# Patient Record
Sex: MALE | Race: OTHER | NOT HISPANIC OR LATINO
[De-identification: names, ages, dates, MRNs, and addresses within clinical notes are randomized per-mention and may not be internally consistent; named-entity substitution may affect disease eponyms.]

---

## 2017-01-02 ENCOUNTER — RX RENEWAL (OUTPATIENT)
Age: 47
End: 2017-01-02

## 2017-01-06 ENCOUNTER — OTHER (OUTPATIENT)
Age: 47
End: 2017-01-06

## 2017-01-11 ENCOUNTER — OTHER (OUTPATIENT)
Age: 47
End: 2017-01-11

## 2017-01-13 ENCOUNTER — OTHER (OUTPATIENT)
Age: 47
End: 2017-01-13

## 2017-02-08 ENCOUNTER — APPOINTMENT (OUTPATIENT)
Dept: INTERNAL MEDICINE | Facility: CLINIC | Age: 47
End: 2017-02-08

## 2017-05-12 ENCOUNTER — MEDICATION RENEWAL (OUTPATIENT)
Age: 47
End: 2017-05-12

## 2017-05-17 ENCOUNTER — APPOINTMENT (OUTPATIENT)
Dept: ENDOCRINOLOGY | Facility: CLINIC | Age: 47
End: 2017-05-17

## 2017-07-17 ENCOUNTER — RX RENEWAL (OUTPATIENT)
Age: 47
End: 2017-07-17

## 2017-07-31 ENCOUNTER — OTHER (OUTPATIENT)
Age: 47
End: 2017-07-31

## 2017-07-31 ENCOUNTER — RX RENEWAL (OUTPATIENT)
Age: 47
End: 2017-07-31

## 2017-08-03 ENCOUNTER — RX RENEWAL (OUTPATIENT)
Age: 47
End: 2017-08-03

## 2017-08-08 ENCOUNTER — MEDICATION RENEWAL (OUTPATIENT)
Age: 47
End: 2017-08-08

## 2017-08-10 ENCOUNTER — MEDICATION RENEWAL (OUTPATIENT)
Age: 47
End: 2017-08-10

## 2017-09-05 ENCOUNTER — RX RENEWAL (OUTPATIENT)
Age: 47
End: 2017-09-05

## 2017-09-25 ENCOUNTER — RX RENEWAL (OUTPATIENT)
Age: 47
End: 2017-09-25

## 2017-09-25 ENCOUNTER — OTHER (OUTPATIENT)
Age: 47
End: 2017-09-25

## 2017-10-05 ENCOUNTER — OTHER (OUTPATIENT)
Age: 47
End: 2017-10-05

## 2017-10-05 ENCOUNTER — RX RENEWAL (OUTPATIENT)
Age: 47
End: 2017-10-05

## 2017-10-16 ENCOUNTER — OTHER (OUTPATIENT)
Age: 47
End: 2017-10-16

## 2017-10-16 ENCOUNTER — RX RENEWAL (OUTPATIENT)
Age: 47
End: 2017-10-16

## 2017-10-25 ENCOUNTER — APPOINTMENT (OUTPATIENT)
Dept: ENDOCRINOLOGY | Facility: CLINIC | Age: 47
End: 2017-10-25

## 2017-11-07 ENCOUNTER — RX RENEWAL (OUTPATIENT)
Age: 47
End: 2017-11-07

## 2017-11-20 ENCOUNTER — OTHER (OUTPATIENT)
Age: 47
End: 2017-11-20

## 2017-11-20 ENCOUNTER — RX RENEWAL (OUTPATIENT)
Age: 47
End: 2017-11-20

## 2017-11-20 ENCOUNTER — APPOINTMENT (OUTPATIENT)
Dept: INTERNAL MEDICINE | Facility: CLINIC | Age: 47
End: 2017-11-20

## 2017-11-25 ENCOUNTER — MEDICATION RENEWAL (OUTPATIENT)
Age: 47
End: 2017-11-25

## 2018-01-14 ENCOUNTER — MEDICATION RENEWAL (OUTPATIENT)
Age: 48
End: 2018-01-14

## 2018-02-14 ENCOUNTER — OTHER (OUTPATIENT)
Age: 48
End: 2018-02-14

## 2018-02-15 ENCOUNTER — MEDICATION RENEWAL (OUTPATIENT)
Age: 48
End: 2018-02-15

## 2018-02-15 RX ORDER — CINACALCET HYDROCHLORIDE 30 MG/1
30 TABLET, COATED ORAL DAILY
Qty: 30 | Refills: 5 | Status: ACTIVE | COMMUNITY
Start: 2017-11-25 | End: 1900-01-01

## 2018-03-16 ENCOUNTER — RX RENEWAL (OUTPATIENT)
Age: 48
End: 2018-03-16

## 2018-03-22 ENCOUNTER — APPOINTMENT (OUTPATIENT)
Dept: INTERNAL MEDICINE | Facility: CLINIC | Age: 48
End: 2018-03-22

## 2018-03-30 ENCOUNTER — APPOINTMENT (OUTPATIENT)
Dept: ENDOCRINOLOGY | Facility: CLINIC | Age: 48
End: 2018-03-30
Payer: MEDICARE

## 2018-03-30 VITALS
HEART RATE: 62 BPM | DIASTOLIC BLOOD PRESSURE: 75 MMHG | BODY MASS INDEX: 66.18 KG/M2 | SYSTOLIC BLOOD PRESSURE: 187 MMHG | WEIGHT: 274.47 LBS

## 2018-03-30 PROCEDURE — 99214 OFFICE O/P EST MOD 30 MIN: CPT

## 2018-03-30 RX ORDER — PEN NEEDLE, DIABETIC 29 G X1/2"
31G X 8 MM NEEDLE, DISPOSABLE MISCELLANEOUS
Qty: 1 | Refills: 5 | Status: ACTIVE | COMMUNITY
Start: 2018-03-30 | End: 1900-01-01

## 2018-03-30 RX ORDER — LACTULOSE 10 G/15ML
10 SOLUTION ORAL TWICE DAILY
Qty: 6 | Refills: 3 | Status: DISCONTINUED | COMMUNITY
Start: 2017-08-08 | End: 2018-03-30

## 2018-03-30 RX ORDER — LANCETS 28 GAUGE
EACH MISCELLANEOUS
Qty: 3 | Refills: 2 | Status: DISCONTINUED | COMMUNITY
Start: 2017-01-02 | End: 2018-03-30

## 2018-03-30 RX ORDER — INSULIN GLARGINE 100 [IU]/ML
100 INJECTION, SOLUTION SUBCUTANEOUS
Qty: 1 | Refills: 5 | Status: ACTIVE | COMMUNITY
Start: 2017-01-13 | End: 1900-01-01

## 2018-04-02 LAB
ALBUMIN SERPL ELPH-MCNC: 3.8 G/DL
ALP BLD-CCNC: 86 U/L
ALT SERPL-CCNC: 23 U/L
ANION GAP SERPL CALC-SCNC: 15 MMOL/L
AST SERPL-CCNC: 21 U/L
BILIRUB SERPL-MCNC: 0.9 MG/DL
BUN SERPL-MCNC: 43 MG/DL
CALCIUM SERPL-MCNC: 9.4 MG/DL
CHLORIDE SERPL-SCNC: 95 MMOL/L
CO2 SERPL-SCNC: 29 MMOL/L
CREAT SERPL-MCNC: 7.07 MG/DL
FRUCTOSAMINE SERPL-MCNC: 415 UMOL/L
GLUCOSE SERPL-MCNC: 131 MG/DL
HBA1C MFR BLD HPLC: 5.8 %
POTASSIUM SERPL-SCNC: 4.8 MMOL/L
PROT SERPL-MCNC: 8.5 G/DL
SODIUM SERPL-SCNC: 139 MMOL/L
TSH SERPL-ACNC: 4.26 UIU/ML

## 2018-04-11 ENCOUNTER — APPOINTMENT (OUTPATIENT)
Dept: ULTRASOUND IMAGING | Facility: HOSPITAL | Age: 48
End: 2018-04-11

## 2018-05-11 ENCOUNTER — APPOINTMENT (OUTPATIENT)
Dept: ULTRASOUND IMAGING | Facility: HOSPITAL | Age: 48
End: 2018-05-11

## 2018-05-21 ENCOUNTER — RX RENEWAL (OUTPATIENT)
Age: 48
End: 2018-05-21

## 2018-06-29 ENCOUNTER — MEDICATION RENEWAL (OUTPATIENT)
Age: 48
End: 2018-06-29

## 2018-06-29 ENCOUNTER — RX RENEWAL (OUTPATIENT)
Age: 48
End: 2018-06-29

## 2018-07-02 ENCOUNTER — OTHER (OUTPATIENT)
Age: 48
End: 2018-07-02

## 2018-07-02 ENCOUNTER — RX RENEWAL (OUTPATIENT)
Age: 48
End: 2018-07-02

## 2018-07-05 ENCOUNTER — MEDICATION RENEWAL (OUTPATIENT)
Age: 48
End: 2018-07-05

## 2018-07-06 ENCOUNTER — RX RENEWAL (OUTPATIENT)
Age: 48
End: 2018-07-06

## 2018-07-12 ENCOUNTER — EMERGENCY (EMERGENCY)
Facility: HOSPITAL | Age: 48
LOS: 1 days | Discharge: HOME | End: 2018-07-12
Attending: EMERGENCY MEDICINE | Admitting: EMERGENCY MEDICINE
Payer: MEDICARE

## 2018-07-12 VITALS
DIASTOLIC BLOOD PRESSURE: 72 MMHG | OXYGEN SATURATION: 96 % | SYSTOLIC BLOOD PRESSURE: 189 MMHG | HEART RATE: 55 BPM | RESPIRATION RATE: 16 BRPM | TEMPERATURE: 98 F

## 2018-07-12 DIAGNOSIS — Z89.512 ACQUIRED ABSENCE OF LEFT LEG BELOW KNEE: Chronic | ICD-10-CM

## 2018-07-12 PROCEDURE — 99284 EMERGENCY DEPT VISIT MOD MDM: CPT | Mod: 25

## 2018-07-12 PROCEDURE — 70450 CT HEAD/BRAIN W/O DYE: CPT

## 2018-07-12 PROCEDURE — 99284 EMERGENCY DEPT VISIT MOD MDM: CPT

## 2018-07-12 PROCEDURE — 70450 CT HEAD/BRAIN W/O DYE: CPT | Mod: 26

## 2018-07-12 NOTE — ED PROVIDER NOTE - PHYSICAL EXAMINATION
VITAL SIGNS: I have reviewed nursing notes and confirm.  CONSTITUTIONAL: Well-developed; well-nourished male sitting up calmly in stretcher; in no acute distress.  SKIN: Agree with RN documentation regarding decubitus evaluation. Remainder of skin exam is warm and dry, no acute rash.  HEAD: Normocephalic; atraumatic.  EYES: PERRL, EOM intact; conjunctiva and sclera clear.  ENT: No nasal discharge; airway clear.  NECK: Supple; non tender.  CARD: S1, S2 normal; no murmurs, gallops, or rubs. RRR  RESP: No wheezes, rales or rhonchi. CTA w/good excursion  ABD: Normal bowel sounds; soft; non-distended; non-tender  EXT: Normal ROM. b/l BKA well healed  LYMPH: No acute cervical adenopathy.  NEURO: Alert, oriented. Grossly unremarkable. CN 2-12 intact b/l, no sensory deficits, speech intact, gait deferred, symm   PSYCH: Cooperative, appropriate.

## 2018-07-12 NOTE — ED PROVIDER NOTE - OBJECTIVE STATEMENT
49 y/o M ESRD on dialysis, T/Th/Sat, received dialysis today at his center, then when transferring from chair to wheelchair, wheelchair flipped back and pt hit his head on the ground. No LOC, remembers event. No visual disturbance or HA. No neck pain. Per dialysis center protocol, sent pt to ED for evaluation. Pt is a vasculopath with b/l BKA's.

## 2018-07-12 NOTE — ED ADULT NURSE NOTE - OBJECTIVE STATEMENT
Patient is 49yo M, AAOx3, in NAD, vitals stable, complaining of mechanical fall from wheelchair earlier today.  Patient states he fell backwards in his wheelchair prior to receiving dialysis today (Tu/Th/Sat), completed dialysis and then was sent in by dialysis center per their protocol.  Patient denies any LOC, dizziness, N/V, changes in vision or any other complaints.  Will continue with plan of care.

## 2018-07-12 NOTE — ED ADULT TRIAGE NOTE - CHIEF COMPLAINT QUOTE
Pt BIBA from dialysis center, while pt was transferring from ambulette to dialysis center this morning pt fell on the floor and hit his head, denies LOC or laceration or pain. Pt had dialysis treatment with no complications. Pt denies any complaints.
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
## 2018-07-16 DIAGNOSIS — Z87.891 PERSONAL HISTORY OF NICOTINE DEPENDENCE: ICD-10-CM

## 2018-07-16 DIAGNOSIS — S09.90XA UNSPECIFIED INJURY OF HEAD, INITIAL ENCOUNTER: ICD-10-CM

## 2018-07-16 DIAGNOSIS — Z91.013 ALLERGY TO SEAFOOD: ICD-10-CM
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
## 2018-07-20 ENCOUNTER — APPOINTMENT (OUTPATIENT)
Dept: INTERNAL MEDICINE | Facility: CLINIC | Age: 48
End: 2018-07-20

## 2018-07-20 PROBLEM — N18.6 END STAGE RENAL DISEASE: Chronic | Status: ACTIVE | Noted: 2018-07-12

## 2018-08-03 ENCOUNTER — MEDICATION RENEWAL (OUTPATIENT)
Age: 48
End: 2018-08-03

## 2018-08-03 ENCOUNTER — APPOINTMENT (OUTPATIENT)
Dept: INTERNAL MEDICINE | Facility: CLINIC | Age: 48
End: 2018-08-03

## 2018-08-03 ENCOUNTER — RX RENEWAL (OUTPATIENT)
Age: 48
End: 2018-08-03

## 2018-08-05 ENCOUNTER — FORM ENCOUNTER (OUTPATIENT)
Age: 48
End: 2018-08-05

## 2018-08-06 ENCOUNTER — OUTPATIENT (OUTPATIENT)
Dept: OUTPATIENT SERVICES | Facility: HOSPITAL | Age: 48
LOS: 1 days | End: 2018-08-06
Payer: MEDICARE

## 2018-08-06 ENCOUNTER — APPOINTMENT (OUTPATIENT)
Dept: ULTRASOUND IMAGING | Facility: HOSPITAL | Age: 48
End: 2018-08-06
Payer: MEDICARE

## 2018-08-06 DIAGNOSIS — Z89.512 ACQUIRED ABSENCE OF LEFT LEG BELOW KNEE: Chronic | ICD-10-CM

## 2018-08-06 PROCEDURE — 76536 US EXAM OF HEAD AND NECK: CPT | Mod: 26

## 2018-08-06 PROCEDURE — 76536 US EXAM OF HEAD AND NECK: CPT

## 2018-08-21 ENCOUNTER — APPOINTMENT (OUTPATIENT)
Dept: OTOLARYNGOLOGY | Facility: CLINIC | Age: 48
End: 2018-08-21
Payer: MEDICARE

## 2018-08-21 VITALS
RESPIRATION RATE: 16 BRPM | HEART RATE: 57 BPM | TEMPERATURE: 98.6 F | DIASTOLIC BLOOD PRESSURE: 82 MMHG | SYSTOLIC BLOOD PRESSURE: 211 MMHG | OXYGEN SATURATION: 97 %

## 2018-08-21 DIAGNOSIS — Z86.39 PERSONAL HISTORY OF OTHER ENDOCRINE, NUTRITIONAL AND METABOLIC DISEASE: ICD-10-CM

## 2018-08-21 DIAGNOSIS — R13.12 DYSPHAGIA, OROPHARYNGEAL PHASE: ICD-10-CM

## 2018-08-21 DIAGNOSIS — J01.00 ACUTE MAXILLARY SINUSITIS, UNSPECIFIED: ICD-10-CM

## 2018-08-21 DIAGNOSIS — E04.9 NONTOXIC GOITER, UNSPECIFIED: ICD-10-CM

## 2018-08-21 DIAGNOSIS — R49.9 UNSPECIFIED VOICE AND RESONANCE DISORDER: ICD-10-CM

## 2018-08-21 DIAGNOSIS — R40.0 SOMNOLENCE: ICD-10-CM

## 2018-08-21 DIAGNOSIS — E06.3 AUTOIMMUNE THYROIDITIS: ICD-10-CM

## 2018-08-21 DIAGNOSIS — Z87.448 PERSONAL HISTORY OF OTHER DISEASES OF URINARY SYSTEM: ICD-10-CM

## 2018-08-21 DIAGNOSIS — R06.02 SHORTNESS OF BREATH: ICD-10-CM

## 2018-08-21 DIAGNOSIS — R22.1 LOCALIZED SWELLING, MASS AND LUMP, NECK: ICD-10-CM

## 2018-08-21 DIAGNOSIS — Z86.79 PERSONAL HISTORY OF OTHER DISEASES OF THE CIRCULATORY SYSTEM: ICD-10-CM

## 2018-08-21 PROCEDURE — 99204 OFFICE O/P NEW MOD 45 MIN: CPT | Mod: 25

## 2018-08-21 PROCEDURE — 31575 DIAGNOSTIC LARYNGOSCOPY: CPT

## 2018-08-21 RX ORDER — LEVOTHYROXINE SODIUM 0.07 MG/1
75 TABLET ORAL
Refills: 0 | Status: ACTIVE | COMMUNITY

## 2018-08-21 RX ORDER — FERRIC CITRATE 210 MG/1
1 GM TABLET, COATED ORAL
Refills: 0 | Status: ACTIVE | COMMUNITY

## 2018-08-21 RX ORDER — INSULIN GLARGINE 100 [IU]/ML
100 INJECTION, SOLUTION SUBCUTANEOUS
Refills: 0 | Status: ACTIVE | COMMUNITY

## 2018-08-21 RX ORDER — AMLODIPINE BESYLATE 10 MG/1
10 TABLET ORAL
Refills: 0 | Status: ACTIVE | COMMUNITY
Start: 1900-01-01 | End: 1900-01-01

## 2018-08-21 RX ORDER — CINACALCET HYDROCHLORIDE 30 MG/1
30 TABLET, COATED ORAL
Refills: 0 | Status: ACTIVE | COMMUNITY

## 2018-08-21 RX ORDER — CITALOPRAM HYDROBROMIDE 40 MG/1
40 TABLET, FILM COATED ORAL
Refills: 0 | Status: ACTIVE | COMMUNITY

## 2018-08-22 ENCOUNTER — RX RENEWAL (OUTPATIENT)
Age: 48
End: 2018-08-22

## 2018-08-22 ENCOUNTER — MEDICATION RENEWAL (OUTPATIENT)
Age: 48
End: 2018-08-22

## 2018-08-24 ENCOUNTER — APPOINTMENT (OUTPATIENT)
Dept: INTERNAL MEDICINE | Facility: CLINIC | Age: 48
End: 2018-08-24
Payer: MEDICARE

## 2018-08-24 VITALS
SYSTOLIC BLOOD PRESSURE: 218 MMHG | HEART RATE: 57 BPM | OXYGEN SATURATION: 95 % | DIASTOLIC BLOOD PRESSURE: 88 MMHG | TEMPERATURE: 98.5 F

## 2018-08-24 VITALS — SYSTOLIC BLOOD PRESSURE: 190 MMHG | DIASTOLIC BLOOD PRESSURE: 70 MMHG

## 2018-08-24 DIAGNOSIS — F32.9 MAJOR DEPRESSIVE DISORDER, SINGLE EPISODE, UNSPECIFIED: ICD-10-CM

## 2018-08-24 DIAGNOSIS — N18.6 END STAGE RENAL DISEASE: ICD-10-CM

## 2018-08-24 DIAGNOSIS — E03.9 HYPOTHYROIDISM, UNSPECIFIED: ICD-10-CM

## 2018-08-24 PROCEDURE — 90670 PCV13 VACCINE IM: CPT | Mod: GC

## 2018-08-24 PROCEDURE — G0009: CPT | Mod: GC

## 2018-08-24 PROCEDURE — 99214 OFFICE O/P EST MOD 30 MIN: CPT | Mod: 25,GC

## 2018-08-24 RX ORDER — CITALOPRAM HYDROBROMIDE 40 MG/1
40 TABLET, FILM COATED ORAL
Qty: 8 | Refills: 0 | Status: ACTIVE | OUTPATIENT
Start: 2018-06-29

## 2018-08-24 RX ORDER — LEVOTHYROXINE SODIUM 0.07 MG/1
75 TABLET ORAL
Refills: 0 | Status: DISCONTINUED | COMMUNITY
End: 2018-08-24

## 2018-08-24 RX ORDER — FLECAINIDE ACETATE 50 MG/1
50 TABLET ORAL
Refills: 0 | Status: DISCONTINUED | COMMUNITY
End: 2018-08-24

## 2018-08-24 RX ORDER — AMLODIPINE BESYLATE 10 MG/1
10 TABLET ORAL
Refills: 0 | Status: DISCONTINUED | COMMUNITY
End: 2018-08-24

## 2018-08-28 ENCOUNTER — FORM ENCOUNTER (OUTPATIENT)
Age: 48
End: 2018-08-28

## 2018-08-29 ENCOUNTER — APPOINTMENT (OUTPATIENT)
Dept: CT IMAGING | Facility: HOSPITAL | Age: 48
End: 2018-08-29
Payer: MEDICARE

## 2018-08-29 ENCOUNTER — OUTPATIENT (OUTPATIENT)
Dept: OUTPATIENT SERVICES | Facility: HOSPITAL | Age: 48
LOS: 1 days | End: 2018-08-29
Payer: MEDICARE

## 2018-08-29 DIAGNOSIS — Z89.512 ACQUIRED ABSENCE OF LEFT LEG BELOW KNEE: Chronic | ICD-10-CM

## 2018-08-29 PROCEDURE — 70490 CT SOFT TISSUE NECK W/O DYE: CPT | Mod: 26

## 2018-08-29 PROCEDURE — 70490 CT SOFT TISSUE NECK W/O DYE: CPT

## 2018-08-31 ENCOUNTER — APPOINTMENT (OUTPATIENT)
Dept: INTERNAL MEDICINE | Facility: CLINIC | Age: 48
End: 2018-08-31

## 2018-09-01 PROBLEM — R13.12 OROPHARYNGEAL DYSPHAGIA: Status: ACTIVE | Noted: 2018-09-01

## 2018-09-06 ENCOUNTER — OUTPATIENT (OUTPATIENT)
Dept: OUTPATIENT SERVICES | Facility: HOSPITAL | Age: 48
LOS: 1 days | End: 2018-09-06
Payer: MEDICARE

## 2018-09-06 ENCOUNTER — APPOINTMENT (OUTPATIENT)
Dept: SLEEP CENTER | Facility: HOSPITAL | Age: 48
End: 2018-09-06

## 2018-09-06 DIAGNOSIS — G47.33 OBSTRUCTIVE SLEEP APNEA (ADULT) (PEDIATRIC): ICD-10-CM

## 2018-09-06 DIAGNOSIS — Z89.512 ACQUIRED ABSENCE OF LEFT LEG BELOW KNEE: Chronic | ICD-10-CM

## 2018-09-06 PROCEDURE — 95811 POLYSOM 6/>YRS CPAP 4/> PARM: CPT | Mod: 26

## 2018-09-06 PROCEDURE — 95811 POLYSOM 6/>YRS CPAP 4/> PARM: CPT

## 2018-09-12 ENCOUNTER — MEDICATION RENEWAL (OUTPATIENT)
Age: 48
End: 2018-09-12

## 2018-09-12 RX ORDER — FERRIC CITRATE 210 MG/1
1 GM TABLET, COATED ORAL 3 TIMES DAILY
Qty: 270 | Refills: 5 | Status: ACTIVE | COMMUNITY
Start: 2018-01-14 | End: 1900-01-01

## 2018-09-14 ENCOUNTER — APPOINTMENT (OUTPATIENT)
Dept: INTERNAL MEDICINE | Facility: CLINIC | Age: 48
End: 2018-09-14
Payer: MEDICARE

## 2018-09-14 VITALS
HEART RATE: 55 BPM | DIASTOLIC BLOOD PRESSURE: 76 MMHG | BODY MASS INDEX: 53.79 KG/M2 | HEIGHT: 60 IN | OXYGEN SATURATION: 92 % | TEMPERATURE: 98.7 F | WEIGHT: 274 LBS | SYSTOLIC BLOOD PRESSURE: 185 MMHG

## 2018-09-14 VITALS — SYSTOLIC BLOOD PRESSURE: 166 MMHG | DIASTOLIC BLOOD PRESSURE: 66 MMHG

## 2018-09-14 DIAGNOSIS — R01.1 CARDIAC MURMUR, UNSPECIFIED: ICD-10-CM

## 2018-09-14 DIAGNOSIS — E04.0 NONTOXIC DIFFUSE GOITER: ICD-10-CM

## 2018-09-14 PROCEDURE — 36415 COLL VENOUS BLD VENIPUNCTURE: CPT

## 2018-09-14 PROCEDURE — 99214 OFFICE O/P EST MOD 30 MIN: CPT | Mod: GC,25

## 2018-09-14 RX ORDER — GLUCOSAM/CHON-MSM1/C/MANG/BOSW 500-416.6
TABLET ORAL
Qty: 1 | Refills: 0 | Status: ACTIVE | COMMUNITY
Start: 2018-04-09 | End: 1900-01-01

## 2018-09-18 ENCOUNTER — OTHER (OUTPATIENT)
Age: 48
End: 2018-09-18

## 2018-09-18 LAB
25(OH)D3 SERPL-MCNC: 29.6 NG/ML
ALBUMIN SERPL ELPH-MCNC: 4 G/DL
ALP BLD-CCNC: 93 U/L
ALT SERPL-CCNC: 17 U/L
ANION GAP SERPL CALC-SCNC: 18 MMOL/L
AST SERPL-CCNC: 18 U/L
BASOPHILS # BLD AUTO: 0.04 K/UL
BASOPHILS NFR BLD AUTO: 0.7 %
BILIRUB SERPL-MCNC: 0.7 MG/DL
BUN SERPL-MCNC: 42 MG/DL
CA-I SERPL-SCNC: 1.17 MMOL/L
CALCIUM SERPL-MCNC: 9.5 MG/DL
CHLORIDE SERPL-SCNC: 94 MMOL/L
CHOLEST SERPL-MCNC: 124 MG/DL
CHOLEST/HDLC SERPL: 3.6 RATIO
CO2 SERPL-SCNC: 28 MMOL/L
CREAT SERPL-MCNC: 6.42 MG/DL
EOSINOPHIL # BLD AUTO: 0.26 K/UL
EOSINOPHIL NFR BLD AUTO: 4.4 %
GLUCOSE SERPL-MCNC: 132 MG/DL
HBA1C MFR BLD HPLC: 6.9 %
HCT VFR BLD CALC: 32.4 %
HDLC SERPL-MCNC: 34 MG/DL
HGB BLD-MCNC: 9.9 G/DL
IMM GRANULOCYTES NFR BLD AUTO: 0.2 %
LDLC SERPL CALC-MCNC: 76 MG/DL
LYMPHOCYTES # BLD AUTO: 1.49 K/UL
LYMPHOCYTES NFR BLD AUTO: 25.1 %
MAN DIFF?: NORMAL
MCHC RBC-ENTMCNC: 29.6 PG
MCHC RBC-ENTMCNC: 30.6 GM/DL
MCV RBC AUTO: 97 FL
MONOCYTES # BLD AUTO: 0.33 K/UL
MONOCYTES NFR BLD AUTO: 5.6 %
NEUTROPHILS # BLD AUTO: 3.8 K/UL
NEUTROPHILS NFR BLD AUTO: 64 %
PARATHYROID HORMONE INTACT: 122 PG/ML
PLATELET # BLD AUTO: 157 K/UL
POTASSIUM SERPL-SCNC: 4 MMOL/L
PROT SERPL-MCNC: 8.4 G/DL
RBC # BLD: 3.34 M/UL
RBC # FLD: 15.4 %
SODIUM SERPL-SCNC: 140 MMOL/L
T3FREE SERPL-MCNC: 2.12 PG/ML
T4 FREE SERPL-MCNC: 1.3 NG/DL
THYROGLOB AB SERPL-ACNC: ABNORMAL IU/ML
THYROPEROXIDASE AB SERPL IA-ACNC: 6487 IU/ML
TRIGL SERPL-MCNC: 69 MG/DL
TSH SERPL-ACNC: 1.71 UIU/ML
WBC # FLD AUTO: 5.93 K/UL

## 2018-09-24 ENCOUNTER — APPOINTMENT (OUTPATIENT)
Dept: OTOLARYNGOLOGY | Facility: CLINIC | Age: 48
End: 2018-09-24

## 2018-09-25 ENCOUNTER — OTHER (OUTPATIENT)
Age: 48
End: 2018-09-25

## 2018-09-26 DIAGNOSIS — Z74.09 OTHER REDUCED MOBILITY: ICD-10-CM

## 2018-09-28 ENCOUNTER — APPOINTMENT (OUTPATIENT)
Dept: HEART AND VASCULAR | Facility: CLINIC | Age: 48
End: 2018-09-28
Payer: MEDICARE

## 2018-09-28 VITALS
SYSTOLIC BLOOD PRESSURE: 198 MMHG | HEIGHT: 60 IN | DIASTOLIC BLOOD PRESSURE: 77 MMHG | BODY MASS INDEX: 51.16 KG/M2 | TEMPERATURE: 98 F | WEIGHT: 260.59 LBS | HEART RATE: 57 BPM | OXYGEN SATURATION: 93 %

## 2018-09-28 DIAGNOSIS — Z01.818 ENCOUNTER FOR OTHER PREPROCEDURAL EXAMINATION: ICD-10-CM

## 2018-09-28 DIAGNOSIS — E11.9 TYPE 2 DIABETES MELLITUS W/OUT COMPLICATIONS: ICD-10-CM

## 2018-09-28 DIAGNOSIS — I10 ESSENTIAL (PRIMARY) HYPERTENSION: ICD-10-CM

## 2018-09-28 DIAGNOSIS — Z87.898 PERSONAL HISTORY OF OTHER SPECIFIED CONDITIONS: ICD-10-CM

## 2018-09-28 PROCEDURE — 93000 ELECTROCARDIOGRAM COMPLETE: CPT

## 2018-09-28 PROCEDURE — 99204 OFFICE O/P NEW MOD 45 MIN: CPT

## 2018-09-28 RX ORDER — ATORVASTATIN CALCIUM 40 MG/1
40 TABLET, FILM COATED ORAL
Qty: 90 | Refills: 3 | Status: ACTIVE | COMMUNITY
Start: 2018-09-28 | End: 1900-01-01

## 2018-09-28 RX ORDER — LOSARTAN POTASSIUM 50 MG/1
50 TABLET, FILM COATED ORAL
Qty: 10 | Refills: 2 | Status: ACTIVE | COMMUNITY
Start: 2018-06-29

## 2018-09-29 PROBLEM — Z87.898 HISTORY OF SNORING: Status: ACTIVE | Noted: 2018-08-21

## 2018-09-29 PROBLEM — I10 HTN (HYPERTENSION): Status: ACTIVE | Noted: 2017-07-31

## 2018-10-02 ENCOUNTER — RX RENEWAL (OUTPATIENT)
Age: 48
End: 2018-10-02

## 2018-10-02 DIAGNOSIS — Z89.9 ACQUIRED ABSENCE OF LIMB, UNSPECIFIED: ICD-10-CM

## 2018-10-02 DIAGNOSIS — Z99.3 DEPENDENCE ON WHEELCHAIR: ICD-10-CM

## 2018-10-02 RX ORDER — WHEELCHAIR
EACH MISCELLANEOUS
Qty: 1 | Refills: 0 | Status: ACTIVE | OUTPATIENT
Start: 2018-10-02

## 2018-10-05 ENCOUNTER — APPOINTMENT (OUTPATIENT)
Dept: PULMONOLOGY | Facility: CLINIC | Age: 48
End: 2018-10-05

## 2018-10-05 ENCOUNTER — OTHER (OUTPATIENT)
Age: 48
End: 2018-10-05

## 2018-10-08 ENCOUNTER — OTHER (OUTPATIENT)
Age: 48
End: 2018-10-08

## 2018-10-09 ENCOUNTER — RX RENEWAL (OUTPATIENT)
Age: 48
End: 2018-10-09

## 2018-10-09 RX ORDER — BLOOD SUGAR DIAGNOSTIC
STRIP MISCELLANEOUS
Qty: 400 | Refills: 0 | Status: ACTIVE | COMMUNITY
Start: 2018-10-09 | End: 1900-01-01

## 2018-10-09 RX ORDER — LANCETS 33 GAUGE
EACH MISCELLANEOUS
Qty: 400 | Refills: 0 | Status: ACTIVE | COMMUNITY
Start: 2018-10-09 | End: 1900-01-01

## 2018-10-09 RX ORDER — BLOOD-GLUCOSE METER
W/DEVICE EACH MISCELLANEOUS
Qty: 1 | Refills: 0 | Status: ACTIVE | COMMUNITY
Start: 2018-10-09 | End: 1900-01-01

## 2018-10-10 ENCOUNTER — APPOINTMENT (OUTPATIENT)
Dept: RADIOLOGY | Facility: HOSPITAL | Age: 48
End: 2018-10-10

## 2018-10-24 ENCOUNTER — APPOINTMENT (OUTPATIENT)
Dept: HEART AND VASCULAR | Facility: CLINIC | Age: 48
End: 2018-10-24

## 2018-10-29 ENCOUNTER — APPOINTMENT (OUTPATIENT)
Dept: ENDOCRINOLOGY | Facility: CLINIC | Age: 48
End: 2018-10-29

## 2024-01-02 NOTE — ED ADULT NURSE NOTE - ED COMFORT CARE
Follow-up for suture removal in 5 to 7 days.10 to 14 days.  Take the prescribed antibiotic medicine you are given as directed until it is gone. Take it even if you feel better. It treats the infection and stops it from returning. Not taking all the medicine can make future infections hard to treat.  Return to emergency department for worsening symptoms or other medical emergencies.  Recommended follow-up with PCP.  Refer to the attached instructions for further information.   Patient informed